# Patient Record
Sex: MALE | Race: WHITE | Employment: OTHER | URBAN - NONMETROPOLITAN AREA
[De-identification: names, ages, dates, MRNs, and addresses within clinical notes are randomized per-mention and may not be internally consistent; named-entity substitution may affect disease eponyms.]

---

## 2018-02-05 ENCOUNTER — HOSPITAL ENCOUNTER (EMERGENCY)
Age: 38
Discharge: ANOTHER HOSPITAL | End: 2018-02-05
Attending: EMERGENCY MEDICINE
Payer: MEDICARE

## 2018-02-05 ENCOUNTER — APPOINTMENT (OUTPATIENT)
Dept: CT IMAGING | Age: 38
End: 2018-02-05
Payer: MEDICARE

## 2018-02-05 ENCOUNTER — HOSPITAL ENCOUNTER (OUTPATIENT)
Age: 38
Setting detail: OBSERVATION
Discharge: STILL ADMITTED OR ADMINISTRATIVE | End: 2018-02-05
Attending: EMERGENCY MEDICINE | Admitting: PEDIATRICS
Payer: MEDICARE

## 2018-02-05 VITALS
TEMPERATURE: 98.7 F | SYSTOLIC BLOOD PRESSURE: 120 MMHG | RESPIRATION RATE: 13 BRPM | HEART RATE: 82 BPM | OXYGEN SATURATION: 95 % | DIASTOLIC BLOOD PRESSURE: 91 MMHG | BODY MASS INDEX: 24.77 KG/M2 | WEIGHT: 193 LBS | HEIGHT: 74 IN

## 2018-02-05 VITALS
RESPIRATION RATE: 18 BRPM | TEMPERATURE: 97.8 F | OXYGEN SATURATION: 97 % | BODY MASS INDEX: 24.77 KG/M2 | WEIGHT: 193 LBS | HEART RATE: 73 BPM | SYSTOLIC BLOOD PRESSURE: 139 MMHG | HEIGHT: 74 IN | DIASTOLIC BLOOD PRESSURE: 84 MMHG

## 2018-02-05 DIAGNOSIS — M79.602 LEFT ARM PAIN: Primary | ICD-10-CM

## 2018-02-05 DIAGNOSIS — R07.9 CHEST PAIN, UNSPECIFIED TYPE: Primary | ICD-10-CM

## 2018-02-05 DIAGNOSIS — R77.8 ELEVATED TROPONIN: ICD-10-CM

## 2018-02-05 PROBLEM — I25.10 CAD IN NATIVE ARTERY: Status: ACTIVE | Noted: 2018-02-05

## 2018-02-05 PROBLEM — I25.9 CHEST PAIN DUE TO MYOCARDIAL ISCHEMIA: Status: ACTIVE | Noted: 2018-02-05

## 2018-02-05 LAB
ABSOLUTE EOS #: 0.07 K/UL (ref 0–0.44)
ABSOLUTE EOS #: 0.1 K/UL (ref 0–0.4)
ABSOLUTE IMMATURE GRANULOCYTE: <0.03 K/UL (ref 0–0.3)
ABSOLUTE IMMATURE GRANULOCYTE: ABNORMAL K/UL (ref 0–0.3)
ABSOLUTE LYMPH #: 1.1 K/UL (ref 1–4.8)
ABSOLUTE LYMPH #: 2.12 K/UL (ref 1.1–3.7)
ABSOLUTE MONO #: 0.4 K/UL (ref 0–1)
ABSOLUTE MONO #: 0.68 K/UL (ref 0.1–1.2)
ACTIVATED CLOTTING TIME: 184 SEC (ref 79–149)
ACTIVATED CLOTTING TIME: 184 SEC (ref 79–149)
ACTIVATED CLOTTING TIME: 221 SEC (ref 79–149)
ALLEN TEST: ABNORMAL
ALLEN TEST: ABNORMAL
ALLEN TEST: NORMAL
ANION GAP SERPL CALCULATED.3IONS-SCNC: 11 MMOL/L (ref 9–17)
ANION GAP SERPL CALCULATED.3IONS-SCNC: 12 MMOL/L (ref 9–17)
BASOPHILS # BLD: 1 % (ref 0–2)
BASOPHILS # BLD: 1 % (ref 0–2)
BASOPHILS ABSOLUTE: 0 K/UL (ref 0–0.2)
BASOPHILS ABSOLUTE: 0.05 K/UL (ref 0–0.2)
BNP INTERPRETATION: NORMAL
BUN BLDV-MCNC: 10 MG/DL (ref 6–20)
BUN BLDV-MCNC: 16 MG/DL (ref 6–20)
BUN/CREAT BLD: 21 (ref 9–20)
BUN/CREAT BLD: ABNORMAL (ref 9–20)
CALCIUM SERPL-MCNC: 9 MG/DL (ref 8.6–10.4)
CALCIUM SERPL-MCNC: 9.5 MG/DL (ref 8.6–10.4)
CHLORIDE BLD-SCNC: 103 MMOL/L (ref 98–107)
CHLORIDE BLD-SCNC: 104 MMOL/L (ref 98–107)
CO2: 22 MMOL/L (ref 20–31)
CO2: 26 MMOL/L (ref 20–31)
CREAT SERPL-MCNC: 0.63 MG/DL (ref 0.7–1.2)
CREAT SERPL-MCNC: 0.78 MG/DL (ref 0.7–1.2)
DIFFERENTIAL TYPE: ABNORMAL
DIFFERENTIAL TYPE: NORMAL
EKG ATRIAL RATE: 68 BPM
EKG ATRIAL RATE: 82 BPM
EKG P AXIS: 27 DEGREES
EKG P AXIS: 31 DEGREES
EKG P-R INTERVAL: 212 MS
EKG P-R INTERVAL: 222 MS
EKG Q-T INTERVAL: 386 MS
EKG Q-T INTERVAL: 402 MS
EKG QRS DURATION: 106 MS
EKG QRS DURATION: 96 MS
EKG QTC CALCULATION (BAZETT): 427 MS
EKG QTC CALCULATION (BAZETT): 450 MS
EKG R AXIS: -34 DEGREES
EKG R AXIS: -38 DEGREES
EKG T AXIS: 20 DEGREES
EKG T AXIS: 22 DEGREES
EKG VENTRICULAR RATE: 68 BPM
EKG VENTRICULAR RATE: 82 BPM
EOSINOPHILS RELATIVE PERCENT: 1 % (ref 0–8)
EOSINOPHILS RELATIVE PERCENT: 1 % (ref 1–4)
FIO2: ABNORMAL
FIO2: ABNORMAL
FIO2: NORMAL
GFR AFRICAN AMERICAN: >60 ML/MIN
GFR AFRICAN AMERICAN: >60 ML/MIN
GFR NON-AFRICAN AMERICAN: >60 ML/MIN
GFR NON-AFRICAN AMERICAN: >60 ML/MIN
GFR SERPL CREATININE-BSD FRML MDRD: ABNORMAL ML/MIN/{1.73_M2}
GLUCOSE BLD-MCNC: 102 MG/DL (ref 70–99)
GLUCOSE BLD-MCNC: 105 MG/DL (ref 70–99)
HCT VFR BLD CALC: 46.6 % (ref 40.7–50.3)
HCT VFR BLD CALC: 47.1 % (ref 41–53)
HEMOGLOBIN: 15.6 G/DL (ref 13–17)
HEMOGLOBIN: 15.8 G/DL (ref 13.5–17)
IMMATURE GRANULOCYTES: 0 %
IMMATURE GRANULOCYTES: ABNORMAL %
INR BLD: 2.4 (ref 0.9–1.2)
LACTIC ACID, WHOLE BLOOD: 1.5 MMOL/L (ref 0.7–2.1)
LACTIC ACID: NORMAL MMOL/L
LYMPHOCYTES # BLD: 17 % (ref 24–44)
LYMPHOCYTES # BLD: 34 % (ref 24–43)
MCH RBC QN AUTO: 29.1 PG (ref 26–34)
MCH RBC QN AUTO: 29.4 PG (ref 25.2–33.5)
MCHC RBC AUTO-ENTMCNC: 33.4 G/DL (ref 31–37)
MCHC RBC AUTO-ENTMCNC: 33.5 G/DL (ref 28.4–34.8)
MCV RBC AUTO: 86.9 FL (ref 80–100)
MCV RBC AUTO: 87.8 FL (ref 82.6–102.9)
MODE: ABNORMAL
MODE: ABNORMAL
MODE: NORMAL
MONOCYTES # BLD: 11 % (ref 3–12)
MONOCYTES # BLD: 7 % (ref 0–12)
NEGATIVE BASE EXCESS, ART: 2 (ref 0–2)
NEGATIVE BASE EXCESS, ART: 5 (ref 0–2)
NEGATIVE BASE EXCESS, ART: NORMAL (ref 0–2)
NRBC AUTOMATED: 0 PER 100 WBC
NRBC AUTOMATED: ABNORMAL PER 100 WBC
O2 DEVICE/FLOW/%: ABNORMAL
O2 DEVICE/FLOW/%: ABNORMAL
O2 DEVICE/FLOW/%: NORMAL
PATIENT TEMP: ABNORMAL
PATIENT TEMP: ABNORMAL
PATIENT TEMP: NORMAL
PDW BLD-RTO: 13.7 % (ref 11.8–14.4)
PDW BLD-RTO: 14.1 % (ref 12.1–15.2)
PLATELET # BLD: 199 K/UL (ref 138–453)
PLATELET # BLD: 211 K/UL (ref 140–450)
PLATELET ESTIMATE: ABNORMAL
PLATELET ESTIMATE: NORMAL
PMV BLD AUTO: 11.7 FL (ref 8.1–13.5)
PMV BLD AUTO: 9.8 FL (ref 6–12)
POC HCO3: 19.3 MMOL/L (ref 21–28)
POC HCO3: 23.4 MMOL/L (ref 21–28)
POC HCO3: 24.7 MMOL/L (ref 21–28)
POC O2 SATURATION: 94 % (ref 94–98)
POC O2 SATURATION: 95 % (ref 94–98)
POC O2 SATURATION: 97 % (ref 94–98)
POC PCO2 TEMP: ABNORMAL MM HG
POC PCO2 TEMP: ABNORMAL MM HG
POC PCO2 TEMP: NORMAL MM HG
POC PCO2: 33.3 MM HG (ref 35–48)
POC PCO2: 38.7 MM HG (ref 35–48)
POC PCO2: 40.7 MM HG (ref 35–48)
POC PH TEMP: ABNORMAL
POC PH TEMP: ABNORMAL
POC PH TEMP: NORMAL
POC PH: 7.37 (ref 7.35–7.45)
POC PH: 7.37 (ref 7.35–7.45)
POC PH: 7.41 (ref 7.35–7.45)
POC PO2 TEMP: ABNORMAL MM HG
POC PO2 TEMP: ABNORMAL MM HG
POC PO2 TEMP: NORMAL MM HG
POC PO2: 71.7 MM HG (ref 83–108)
POC PO2: 79 MM HG (ref 83–108)
POC PO2: 88 MM HG (ref 83–108)
POC TROPONIN I: 44.2 NG/ML (ref 0–0.1)
POC TROPONIN INTERP: ABNORMAL
POSITIVE BASE EXCESS, ART: 0 (ref 0–3)
POSITIVE BASE EXCESS, ART: ABNORMAL (ref 0–3)
POSITIVE BASE EXCESS, ART: ABNORMAL (ref 0–3)
POTASSIUM SERPL-SCNC: 4 MMOL/L (ref 3.7–5.3)
POTASSIUM SERPL-SCNC: 4.7 MMOL/L (ref 3.7–5.3)
PRO-BNP: 136 PG/ML
PROTHROMBIN TIME: 26.8 SEC (ref 9.7–12.2)
RBC # BLD: 5.31 M/UL (ref 4.21–5.77)
RBC # BLD: 5.42 M/UL (ref 4.5–5.9)
RBC # BLD: ABNORMAL 10*6/UL
RBC # BLD: NORMAL 10*6/UL
SAMPLE SITE: ABNORMAL
SAMPLE SITE: ABNORMAL
SAMPLE SITE: NORMAL
SEG NEUTROPHILS: 53 % (ref 36–65)
SEG NEUTROPHILS: 74 % (ref 36–66)
SEGMENTED NEUTROPHILS ABSOLUTE COUNT: 3.24 K/UL (ref 1.5–8.1)
SEGMENTED NEUTROPHILS ABSOLUTE COUNT: 5 K/UL (ref 1.8–7.7)
SODIUM BLD-SCNC: 137 MMOL/L (ref 135–144)
SODIUM BLD-SCNC: 141 MMOL/L (ref 135–144)
TCO2 (CALC), ART: 20 MMOL/L (ref 22–29)
TCO2 (CALC), ART: 25 MMOL/L (ref 22–29)
TCO2 (CALC), ART: 26 MMOL/L (ref 22–29)
TROPONIN INTERP: ABNORMAL
TROPONIN T: 0.07 NG/ML
TROPONIN T: 3.21 NG/ML
TROPONIN T: 3.6 NG/ML
WBC # BLD: 6.2 K/UL (ref 3.5–11.3)
WBC # BLD: 6.7 K/UL (ref 3.5–11)
WBC # BLD: ABNORMAL 10*3/UL
WBC # BLD: NORMAL 10*3/UL

## 2018-02-05 PROCEDURE — 83605 ASSAY OF LACTIC ACID: CPT

## 2018-02-05 PROCEDURE — 6370000000 HC RX 637 (ALT 250 FOR IP): Performed by: EMERGENCY MEDICINE

## 2018-02-05 PROCEDURE — C1894 INTRO/SHEATH, NON-LASER: HCPCS

## 2018-02-05 PROCEDURE — C1769 GUIDE WIRE: HCPCS

## 2018-02-05 PROCEDURE — 85347 COAGULATION TIME ACTIVATED: CPT

## 2018-02-05 PROCEDURE — 86900 BLOOD TYPING SEROLOGIC ABO: CPT

## 2018-02-05 PROCEDURE — 71275 CT ANGIOGRAPHY CHEST: CPT

## 2018-02-05 PROCEDURE — 86901 BLOOD TYPING SEROLOGIC RH(D): CPT

## 2018-02-05 PROCEDURE — 80048 BASIC METABOLIC PNL TOTAL CA: CPT

## 2018-02-05 PROCEDURE — 99285 EMERGENCY DEPT VISIT HI MDM: CPT

## 2018-02-05 PROCEDURE — 93320 DOPPLER ECHO COMPLETE: CPT

## 2018-02-05 PROCEDURE — 6360000004 HC RX CONTRAST MEDICATION: Performed by: EMERGENCY MEDICINE

## 2018-02-05 PROCEDURE — 2500000003 HC RX 250 WO HCPCS: Performed by: EMERGENCY MEDICINE

## 2018-02-05 PROCEDURE — 93567 NJX CAR CTH SPRVLV AORTGRPHY: CPT

## 2018-02-05 PROCEDURE — 6360000002 HC RX W HCPCS

## 2018-02-05 PROCEDURE — 6360000002 HC RX W HCPCS: Performed by: EMERGENCY MEDICINE

## 2018-02-05 PROCEDURE — 84484 ASSAY OF TROPONIN QUANT: CPT

## 2018-02-05 PROCEDURE — 99284 EMERGENCY DEPT VISIT MOD MDM: CPT

## 2018-02-05 PROCEDURE — 93005 ELECTROCARDIOGRAM TRACING: CPT

## 2018-02-05 PROCEDURE — 83880 ASSAY OF NATRIURETIC PEPTIDE: CPT

## 2018-02-05 PROCEDURE — 82803 BLOOD GASES ANY COMBINATION: CPT

## 2018-02-05 PROCEDURE — 75625 CONTRAST EXAM ABDOMINL AORTA: CPT

## 2018-02-05 PROCEDURE — 36415 COLL VENOUS BLD VENIPUNCTURE: CPT

## 2018-02-05 PROCEDURE — 96374 THER/PROPH/DIAG INJ IV PUSH: CPT

## 2018-02-05 PROCEDURE — 93304 ECHO TRANSTHORACIC: CPT

## 2018-02-05 PROCEDURE — 93456 R HRT CORONARY ARTERY ANGIO: CPT

## 2018-02-05 PROCEDURE — 85025 COMPLETE CBC W/AUTO DIFF WBC: CPT

## 2018-02-05 PROCEDURE — 86920 COMPATIBILITY TEST SPIN: CPT

## 2018-02-05 PROCEDURE — 96376 TX/PRO/DX INJ SAME DRUG ADON: CPT

## 2018-02-05 PROCEDURE — 86850 RBC ANTIBODY SCREEN: CPT

## 2018-02-05 PROCEDURE — 85610 PROTHROMBIN TIME: CPT

## 2018-02-05 PROCEDURE — 93325 DOPPLER ECHO COLOR FLOW MAPG: CPT

## 2018-02-05 PROCEDURE — C1725 CATH, TRANSLUMIN NON-LASER: HCPCS

## 2018-02-05 RX ORDER — SODIUM CHLORIDE 0.9 % (FLUSH) 0.9 %
3 SYRINGE (ML) INJECTION PRN
Status: CANCELLED | OUTPATIENT
Start: 2018-02-05

## 2018-02-05 RX ORDER — MORPHINE SULFATE 4 MG/ML
4 INJECTION, SOLUTION INTRAMUSCULAR; INTRAVENOUS ONCE
Status: COMPLETED | OUTPATIENT
Start: 2018-02-05 | End: 2018-02-05

## 2018-02-05 RX ORDER — WARFARIN SODIUM 5 MG/1
5 TABLET ORAL
Status: CANCELLED | OUTPATIENT
Start: 2018-02-05 | End: 2018-02-06

## 2018-02-05 RX ORDER — ENALAPRIL MALEATE 5 MG/1
1 TABLET ORAL DAILY
Status: CANCELLED | OUTPATIENT
Start: 2018-02-05

## 2018-02-05 RX ORDER — MORPHINE SULFATE 4 MG/ML
4 INJECTION, SOLUTION INTRAMUSCULAR; INTRAVENOUS ONCE
Status: DISCONTINUED | OUTPATIENT
Start: 2018-02-05 | End: 2018-02-05 | Stop reason: SDUPTHER

## 2018-02-05 RX ORDER — ASPIRIN 81 MG/1
324 TABLET, CHEWABLE ORAL ONCE
Status: COMPLETED | OUTPATIENT
Start: 2018-02-05 | End: 2018-02-05

## 2018-02-05 RX ORDER — MORPHINE SULFATE 4 MG/ML
4 INJECTION, SOLUTION INTRAMUSCULAR; INTRAVENOUS ONCE
Status: DISCONTINUED | OUTPATIENT
Start: 2018-02-05 | End: 2018-02-05 | Stop reason: RX

## 2018-02-05 RX ORDER — NITROGLYCERIN 20 MG/100ML
5 INJECTION INTRAVENOUS CONTINUOUS
Status: DISCONTINUED | OUTPATIENT
Start: 2018-02-05 | End: 2018-02-05 | Stop reason: HOSPADM

## 2018-02-05 RX ORDER — METOPROLOL TARTRATE 50 MG/1
50 TABLET, FILM COATED ORAL 2 TIMES DAILY
Status: CANCELLED | OUTPATIENT
Start: 2018-02-05

## 2018-02-05 RX ORDER — MORPHINE SULFATE 2 MG/ML
4 INJECTION, SOLUTION INTRAMUSCULAR; INTRAVENOUS ONCE
Status: COMPLETED | OUTPATIENT
Start: 2018-02-05 | End: 2018-02-05

## 2018-02-05 RX ORDER — MORPHINE SULFATE 4 MG/ML
4 INJECTION, SOLUTION INTRAMUSCULAR; INTRAVENOUS ONCE
Status: DISCONTINUED | OUTPATIENT
Start: 2018-02-05 | End: 2018-02-05

## 2018-02-05 RX ORDER — WARFARIN SODIUM 10 MG/1
10 TABLET ORAL DAILY
Status: CANCELLED | OUTPATIENT
Start: 2018-02-05

## 2018-02-05 RX ORDER — ACETAMINOPHEN 325 MG/1
650 TABLET ORAL EVERY 8 HOURS PRN
Status: CANCELLED | OUTPATIENT
Start: 2018-02-05

## 2018-02-05 RX ORDER — ENALAPRIL MALEATE 10 MG/1
1 TABLET ORAL DAILY
Status: CANCELLED | OUTPATIENT
Start: 2018-02-05

## 2018-02-05 RX ORDER — ACETAMINOPHEN 160 MG/5ML
650 SOLUTION ORAL EVERY 8 HOURS PRN
Status: CANCELLED | OUTPATIENT
Start: 2018-02-05

## 2018-02-05 RX ADMIN — MORPHINE SULFATE 4 MG: 2 INJECTION, SOLUTION INTRAMUSCULAR; INTRAVENOUS at 09:42

## 2018-02-05 RX ADMIN — IOPAMIDOL 100 ML: 612 INJECTION, SOLUTION INTRAVENOUS at 04:07

## 2018-02-05 RX ADMIN — ASPIRIN 81 MG 324 MG: 81 TABLET ORAL at 03:42

## 2018-02-05 RX ADMIN — MORPHINE SULFATE 4 MG: 2 INJECTION, SOLUTION INTRAMUSCULAR; INTRAVENOUS at 07:51

## 2018-02-05 RX ADMIN — MORPHINE SULFATE 4 MG: 4 INJECTION, SOLUTION INTRAMUSCULAR; INTRAVENOUS at 04:08

## 2018-02-05 RX ADMIN — NITROGLYCERIN 5 MCG/MIN: 20 INJECTION INTRAVENOUS at 09:39

## 2018-02-05 RX ADMIN — MORPHINE SULFATE 4 MG: 4 INJECTION, SOLUTION INTRAMUSCULAR; INTRAVENOUS at 05:33

## 2018-02-05 ASSESSMENT — PAIN SCALES - GENERAL
PAINLEVEL_OUTOF10: 6
PAINLEVEL_OUTOF10: 5
PAINLEVEL_OUTOF10: 7
PAINLEVEL_OUTOF10: 7
PAINLEVEL_OUTOF10: 4
PAINLEVEL_OUTOF10: 6
PAINLEVEL_OUTOF10: 2

## 2018-02-05 ASSESSMENT — PAIN DESCRIPTION - DESCRIPTORS
DESCRIPTORS: ACHING

## 2018-02-05 ASSESSMENT — ENCOUNTER SYMPTOMS
SHORTNESS OF BREATH: 0
BLOOD IN STOOL: 0
NAUSEA: 0
EYE REDNESS: 0
DIARRHEA: 0
COUGH: 0
RHINORRHEA: 0
EYE DISCHARGE: 0
SORE THROAT: 0
VOMITING: 0
CHEST TIGHTNESS: 0
ABDOMINAL PAIN: 0

## 2018-02-05 ASSESSMENT — PAIN DESCRIPTION - PAIN TYPE
TYPE: ACUTE PAIN

## 2018-02-05 ASSESSMENT — HEART SCORE: ECG: 0

## 2018-02-05 ASSESSMENT — PAIN DESCRIPTION - ORIENTATION
ORIENTATION: LEFT

## 2018-02-05 ASSESSMENT — PAIN DESCRIPTION - FREQUENCY
FREQUENCY: CONTINUOUS

## 2018-02-05 ASSESSMENT — PAIN DESCRIPTION - LOCATION
LOCATION: ARM
LOCATION: ARM;JAW
LOCATION: ARM;JAW
LOCATION: JAW
LOCATION: ARM;JAW

## 2018-02-05 NOTE — ED PROVIDER NOTES
Spouse name: N/A    Number of children: N/A    Years of education: N/A     Occupational History    Not on file. Social History Main Topics    Smoking status: Never Smoker    Smokeless tobacco: Never Used    Alcohol use Yes      Comment: socially    Drug use: No    Sexual activity: Not on file     Other Topics Concern    Not on file     Social History Narrative    No narrative on file       Family History   Problem Relation Age of Onset   24 [facility] Father      colon       Allergies:  No known allergies    Home Medications:  Prior to Admission medications    Medication Sig Start Date End Date Taking? Authorizing Provider   enalapril (VASOTEC) 10 MG tablet TAKE ONE TABLET BY MOUTH EVERY DAY 3/10/16   Irma Pablo MD   enalapril (VASOTEC) 5 MG tablet TAKE ONE TABLET BY MOUTH DAILY 3/10/16   Irma Pablo MD   metoprolol (LOPRESSOR) 50 MG tablet Take 1 tablet by mouth 2 times daily 3/10/16   Irma Pablo MD   warfarin (COUMADIN) 5 MG tablet TAKE TWO TABLETS BY MOUTH DAILY 12/23/15   Irma Pablo MD   warfarin (COUMADIN) 5 MG tablet Take 2 tablets by mouth daily. Patient taking differently: Take 10 mg by mouth daily 7.5mg alternating with 10mg every other day 10/16/14   Poli Diaz MD   aspirin 81 MG tablet Take 81 mg by mouth daily. Historical Provider, MD       REVIEW OF SYSTEMS    (2-9 systems for level 4, 10 or more for level 5)      Review of Systems   Constitutional: Negative for chills and fever. HENT: Negative for congestion, rhinorrhea and sore throat. Left-sided jaw pain   Eyes: Negative for discharge and redness. Respiratory: Negative for cough, chest tightness and shortness of breath. Cardiovascular: Positive for chest pain. Gastrointestinal: Negative for abdominal pain, blood in stool, diarrhea, nausea and vomiting. Endocrine: Negative for polydipsia and polyuria. Genitourinary: Negative for dysuria and hematuria.    Musculoskeletal: Negative for

## 2018-02-05 NOTE — H&P
Adult congenital cardiology cardiac catheterization  History and physical examination     Patient: Verna Christensen  MR Number: [id number]  YOB: 1980  Date of  Procedure:  February 5, 2018  PCP: Radha Jimenez MD        Reason for cardiac catheterization:  Acute rise in serum troponin with possible ischemic symptoms but normal EKG     History of present illness:  Forest Wilde as you know is a 40year-old with Marfan syndrome who has a prosthetic aortic valve after having a Bentall procedure for ascending aortic dissection In 1991. He subsequently develops symptoms of acute dissection and in 2010 underwent aortic fenestration and stenting of the lead point of a area of dissection in his arch. In 2011 January he underwent redo mitral valve repair because of severe mitral insufficiency from what appeared to be a partial flail mitral chord. In March of 2011 he had further replacement of his distal aortic arch as well as the proximal 3rd of his thoracic descending aorta which at that time measured 52 mm because of vasculopathy.     In June of 2011 he was noted to have a nonischemic left ventricular cardiomyopathy with an ejection fraction that was significantly diminished and associated ventricular tachycardia and thus had an AICD placed at the [facility]. He has since showed improvement in ventricular systolic function and continues on systemic anticoagulation with intermittent episodes of poor compliance, but no complications regarding his prosthetic aortic valve but has not recently had his PT/INR performed.       Early this am he developed 7 of 10 chest discomfort with radiation to the jaw and left shoulder pain. He decided to go to the ED at [facility] where he was mildly hypertensive, with normal oxygen saturations and no dysrhythmia. His Blood work was remarkable for an elevated troponin I and I advised transfer to [facility] .   His CT was essentially unchanged with Yanni measured at the diaphram (47 x 52 mm) and at the mid-thoracic area (52 x 54 mm) with chronic dissection and no contrast extravasation. Since our last evaluation in 2016, he has not shown up for follow-up. He was seen by Dr. Luis Tamez over almost 2 years ago with no records AICD device discharged and no subclinical dysrhythmia.         He lives at home with his girlfriend and 3and a half-year-old daughter. He denies smoking, but does occasionally consume alcohol. Current Coumadin dose is 7.5 mg, 3 times a week, and 10 mg on all other days.  Enalapril 10 mg in the morning and 5 mg in the evening.      Respiratory ROS: unremarkable  Endocrine ROS: unremarkable  Gastrointestinal ROS: unremarkable  Genito-Urinary ROS: unremarkable  Dermatological ROS: unremarkable  Lymphatic ROS:unremarkable  Musculoskeletal ROS: unremarkable  Allergy and Immunology ROS: unremarkable                 Current Outpatient Prescriptions   Medication Sig Dispense Refill    enalapril (VASOTEC) 10 MG tablet Take 1 tablet daily 30 tablet 11    enalapril (VASOTEC) 5 MG tablet Take 1 tablet daily 30 tablet 11    metoprolol (LOPRESSOR) 50 MG tablet Take 1 tablet 2 times daily 60 tablet 11    warfarin (COUMADIN) 5 MG tablet Take 2 tablets daily 60 tablet 11    aspirin 81 MG tablet Take 81 mg daily.                  Allergies   Allergen Reactions    No Known Allergies        On physical examination, Marixa Villalba was alert, cooperative, acyanotic and in no apparent distress. Vitals   There were no vitals filed for this visit.        The HEENT examination is unremarkable.  Lungs were clear bilaterally.  Chest examination demonstrated a right and thoracotomy scar as well as a sternotomy incision and he was quite thin.  There was no palpable thrill and he had a normal  percordial impulse with a shift in the apical impulse laterally and inferiorly.  There was a regular rate and rhythm with a normal first heart sound and mechanical and palpable second heart sound.  A

## 2018-02-05 NOTE — OP NOTE
mid-thoracic area (52 x 54 mm) with chronic dissection and no contrast extravasation. Since our last evaluation in 2016, he has not shown up for follow-up. He was seen by Dr. Celso Alegria over almost 2 years ago with no records AICD device discharged and no subclinical dysrhythmia.       Mihai Carlin tells me that he has been experiencing occasional palpitations, and one episode of tachycardia that lasted roughly 5 seconds, but was not associated with dizziness, or syncope. He states that he can experience one self-limited episode of fast heart rate roughly every month, but this is not prompted him to go to the emergency room. He still gets occasional headaches, but none are associated with any neurologic compromise. He denies any episodes of lethargy, or cyanosis. From a noncardiac perspective, he does get occasional low back pain, but does not complain of any sciatica.  He denies any recent episodes of vomiting, diarrhea, abdominal pain, or respiratory distress. His last INR was months ago. He tells me he has not experienced any significant bleeding and occasionally notices mild bruising but no significant hematomas. He is currently not working.      He lives at home with his girlfriend and 3and a half-year-old daughter. He denies smoking, but does occasionally consume alcohol.  Current Coumadin dose is 7.5 mg, 3 times a week, and 10 mg on all other days.  Enalapril 10 mg in the morning and 5 mg in the evening.      Respiratory ROS: unremarkable  Endocrine ROS: unremarkable  Gastrointestinal ROS: unremarkable  Genito-Urinary ROS: unremarkable  Dermatological ROS: unremarkable  Lymphatic ROS:unremarkable  Musculoskeletal ROS: unremarkable  Allergy and Immunology ROS: unremarkable                 Current Outpatient Prescriptions   Medication Sig Dispense Refill    enalapril (VASOTEC) 10 MG tablet Take 1 tablet daily 30 tablet 11    enalapril (VASOTEC) 5 MG tablet Take 1 tablet daily 30 tablet 11    metoprolol (LOPRESSOR) available to compare.        Prominent aneurysmal dilatation of the mid distal thoracic aorta measuring 5.1 cm in diameter.        Aneurysm dilatation of the ascending aorta measuring 3.6 cm in diameter        No evidence of pulmonary embolus or infiltrate.      Last AICD check 2016 (Dr. Corona Ridley) : Good battery life. No device discharges.      Last recorded echocardiogram: 9-: S/p aortic dissection (type B) graft replacement of aortic root and proximal descending aorta.  S/p Bentall repair (aortic valve replacement and aortic arch repair). S/p mitral valve repair with Reji ring with mild residual prolapse and regurgitation. Suboptimal imaging due to poor acoustic windows. Mild left ventricular dilatation with mild LVH and normal systolic function. Minimal increased velocity across the prosthetic aortic valve with trivial central regurgitation.  Mild dilatation seen in the aortic root. Normal flow velocity across the descending aorta. Mild mitral valve regurgitation with trivial prolapse and associated left   atrial enlargement. Mitral valve E/A wave reversal is seen consistent with impaired compliance.     Today's echocardiogram is similar but limited by a poor transthoracic window. Heart function is normal. Redundant mitral valve with trivial regurgitation. Prior concerns, and follow-up:   1. Mild left ventricular dilatation with normal systolic function. 2. Mild increased velocity across the prosthetic aortic valve with trivial regurgitation. 3. Mild aneurysm dilatation seen in the aortic root (41 mm -possibly underestimated)  4. Mild mitral valve regurgitation with trivial prolapse. Mitral valve E/A wave reversal is seen consistent with impaired compliance. 5. The proximal transverse arch was not well seen. 6. Descending aortic aneurysm, not seen by echocardiography.  The previously visualized on CT scan.       Today's laboratory studies demonstrated a normal set of chemistries and with LA mean 10 mmHg). Narciso: 100/58 (76) mmHg; AAo: 100/63 (80) mmHg. Assumed MVO2: 175 ml/min/m2      Angiography:  1. Left ventricular injection was performed in retrograde fashion using a pigtail catheter positioned in the left ventricular apex. Initial opaciflcation demonstrates a normal sized smooth-walled left ventricle with normal systolic function. There is no atrioventricular valve regurgitation and no ectopy was seen. The outflow tract is unobstructed and the aortic arch is left-sided with normal branching.     2. Aortic root injection: No aortic regurgitation seen. The right coronary artery is profiled well and is without abnormality. The left main coronary artery demonstrates mild ectasia but both the left circumflex and LAD appear normal without distal stenosis or aneurysm.     3. The thoracic aortagram showed no area of dilation in the proximal descending aorta. The left subclavian artery did show mid-vessel stenosis with otherwise normal head and neck vessel branching. A small ductal diverticulum is seen on lateral imaging without PDA.     4.    A selective left subclavian arterial injection was performed after manipulation of the left arm and no stenosis was seen. The previously seen narrowing was thus felt to be positional in nature.      5.   (Selective left coronary arterial injection) demonstrates mild ectasia of the left main coronary artery measuring 5-5.5 mm. There is no thrombus or stenosis seen. The left circumflex and LAD are normal without distal aneurysm or stenosis. There is no orifice stenosis, vasospasm, dissection or aneurysms. There is no obvious filling defect or evidence of myocardial bridging, The coronary sinus filling phase was normal.     Complications: None                        Fluoroscopy Time: 7.8 min              Contrast: 125 c.c.s     Post-Cath. Diagnosis:     1. S/P Kawasaki's syndrome     2.   Mild left main coronary arterial ectasia (5-5.5 mm by

## 2018-02-05 NOTE — H&P
Adult congenital cardiology cardiac catheterization  History and physical examination     Patient: Larissa Wen  MR Number: [id number]  YOB: 1980  Date of  Procedure:  February 5, 2018  PCP: Cassie Diehl MD        Reason for cardiac catheterization:  Acute rise in serum troponin with possible ischemic symptoms but normal EKG     History of present illness:  Ramesh as you know is a 40year-old with Marfan syndrome who has a prosthetic aortic valve after having a Bentall procedure for ascending aortic dissection In 1991. He subsequently develops symptoms of acute dissection and in 2010 underwent aortic fenestration and stenting of the lead point of a area of dissection in his arch.  In 2011 January he underwent redo mitral valve repair because of severe mitral insufficiency from what appeared to be a partial flail mitral chord. In March of 2011 he had further replacement of his distal aortic arch as well as the proximal 3rd of his thoracic descending aorta which at that time measured 52 mm because of vasculopathy.     In June of 2011 he was noted to have a nonischemic left ventricular cardiomyopathy with an ejection fraction that was significantly diminished and associated ventricular tachycardia and thus had an AICD placed at the [facility].  He has since showed improvement in ventricular systolic function and continues on systemic anticoagulation with intermittent episodes of poor compliance, but no complications regarding his prosthetic aortic valve but has not recently had his PT/INR performed.       Early this am he developed 7 of 10 chest discomfort with radiation to the jaw and left shoulder pain.  He decided to go to the ED at [place] where he was mildly hypertensive, with normal oxygen saturations and no dysrhythmia.  His Blood work was remarkable for an elevated troponin I and I advised transfer to [facility] . [facility] CT was essentially unchanged with Yanni measured at the thoracic aorta measuring 5.1 cm in diameter.        Aneurysm dilatation of the ascending aorta measuring 3.6 cm in diameter        No evidence of pulmonary embolus or infiltrate.      Last AICD check 2016 (Dr. Miladys Stanley) : Good battery life.  No device discharges.      Last recorded echocardiogram: 9-: S/p aortic dissection (type B) graft replacement of aortic root and proximal descending aorta.   S/p Bentall repair (aortic valve replacement and aortic arch repair).  S/p mitral valve repair with Reji ring with mild residual prolapse and regurgitation.  Suboptimal imaging due to poor acoustic windows.  Mild left ventricular dilatation with mild LVH and normal systolic function.  Minimal increased velocity across the prosthetic aortic valve with trivial central regurgitation.  Mild dilatation seen in the aortic root.  Normal flow velocity across the descending aorta.  Mild mitral valve regurgitation with trivial prolapse and associated left   atrial enlargement.  Mitral valve E/A wave reversal is seen consistent with impaired compliance.     Prior concerns, and follow-up:   1. Mild left ventricular dilatation with normal systolic function. 2. Mild increased velocity across the prosthetic aortic valve with trivial regurgitation. 3. Mild aneurysm dilatation seen in the aortic root (41 mm -possibly underestimated)  4. Mild mitral valve regurgitation with trivial prolapse. Mitral valve E/A wave reversal is seen consistent with impaired compliance. 5. The proximal transverse arch was not well seen. 6. Descending aortic aneurysm, not seen by echocardiography. The previously visualized on CT scan.       Today's laboratory studies demonstrated a normal set of chemistries and normal creatinine.  His BUN was mildly elevated and calcium was normal.  BNP was 136 and initial troponin was 0.07 with a subsequent repeat after number of hours to  3. 21.     CBC was normal with a hemoglobin of 15.8 grams/dl and platelet

## 2018-02-06 LAB
ABO/RH: NORMAL
ANTIBODY SCREEN: NEGATIVE
ARM BAND NUMBER: NORMAL
BLD PROD TYP BPU: NORMAL
BLD PROD TYP BPU: NORMAL
CROSSMATCH RESULT: NORMAL
CROSSMATCH RESULT: NORMAL
DISPENSE STATUS BLOOD BANK: NORMAL
DISPENSE STATUS BLOOD BANK: NORMAL
EXPIRATION DATE: NORMAL
TRANSFUSION STATUS: NORMAL
TRANSFUSION STATUS: NORMAL
UNIT DIVISION: 0
UNIT DIVISION: 0
UNIT NUMBER: NORMAL
UNIT NUMBER: NORMAL

## 2018-02-08 LAB
EKG ATRIAL RATE: 72 BPM
EKG P AXIS: 46 DEGREES
EKG P-R INTERVAL: 204 MS
EKG Q-T INTERVAL: 386 MS
EKG QRS DURATION: 92 MS
EKG QTC CALCULATION (BAZETT): 422 MS
EKG R AXIS: -45 DEGREES
EKG T AXIS: 8 DEGREES
EKG VENTRICULAR RATE: 72 BPM

## 2018-08-08 ENCOUNTER — HOSPITAL ENCOUNTER (EMERGENCY)
Age: 38
Discharge: HOME | End: 2018-08-09
Attending: EMERGENCY MEDICINE
Payer: MEDICARE

## 2018-08-08 DIAGNOSIS — R07.9 CHEST PAIN, UNSPECIFIED TYPE: Primary | ICD-10-CM

## 2018-08-08 PROCEDURE — 93005 ELECTROCARDIOGRAM TRACING: CPT

## 2018-08-08 PROCEDURE — 99285 EMERGENCY DEPT VISIT HI MDM: CPT

## 2018-08-08 ASSESSMENT — PAIN DESCRIPTION - LOCATION: LOCATION: CHEST

## 2018-08-08 ASSESSMENT — PAIN DESCRIPTION - PAIN TYPE: TYPE: ACUTE PAIN

## 2018-08-08 ASSESSMENT — PAIN DESCRIPTION - ORIENTATION: ORIENTATION: LEFT

## 2018-08-08 ASSESSMENT — PAIN SCALES - GENERAL: PAINLEVEL_OUTOF10: 9

## 2018-08-09 ENCOUNTER — APPOINTMENT (OUTPATIENT)
Dept: CT IMAGING | Age: 38
End: 2018-08-09
Payer: MEDICARE

## 2018-08-09 VITALS
OXYGEN SATURATION: 94 % | RESPIRATION RATE: 12 BRPM | TEMPERATURE: 98.3 F | DIASTOLIC BLOOD PRESSURE: 62 MMHG | HEART RATE: 68 BPM | SYSTOLIC BLOOD PRESSURE: 128 MMHG

## 2018-08-09 LAB
ALBUMIN SERPL-MCNC: 4.6 G/DL (ref 3.5–5.2)
ALBUMIN/GLOBULIN RATIO: 1.4 (ref 1–2.5)
ALP BLD-CCNC: 92 U/L (ref 40–129)
ALT SERPL-CCNC: 18 U/L (ref 5–41)
ANION GAP SERPL CALCULATED.3IONS-SCNC: 13 MMOL/L (ref 9–17)
AST SERPL-CCNC: 26 U/L
BILIRUB SERPL-MCNC: 0.33 MG/DL (ref 0.3–1.2)
BUN BLDV-MCNC: 14 MG/DL (ref 6–20)
BUN/CREAT BLD: 18 (ref 9–20)
CALCIUM SERPL-MCNC: 9.4 MG/DL (ref 8.6–10.4)
CHLORIDE BLD-SCNC: 103 MMOL/L (ref 98–107)
CO2: 27 MMOL/L (ref 20–31)
CREAT SERPL-MCNC: 0.8 MG/DL (ref 0.7–1.2)
GFR AFRICAN AMERICAN: >60 ML/MIN
GFR NON-AFRICAN AMERICAN: >60 ML/MIN
GFR SERPL CREATININE-BSD FRML MDRD: ABNORMAL ML/MIN/{1.73_M2}
GFR SERPL CREATININE-BSD FRML MDRD: ABNORMAL ML/MIN/{1.73_M2}
GLUCOSE BLD-MCNC: 105 MG/DL (ref 70–99)
HCT VFR BLD CALC: 39.4 % (ref 40.7–50.3)
HEMOGLOBIN: 12 G/DL (ref 13–17)
MCH RBC QN AUTO: 27.1 PG (ref 25.2–33.5)
MCHC RBC AUTO-ENTMCNC: 30.5 G/DL (ref 28.4–34.8)
MCV RBC AUTO: 89.1 FL (ref 82.6–102.9)
NRBC AUTOMATED: 0 PER 100 WBC
PDW BLD-RTO: 14.7 % (ref 11.8–14.4)
PLATELET # BLD: 352 K/UL (ref 138–453)
PMV BLD AUTO: 10 FL (ref 8.1–13.5)
POTASSIUM SERPL-SCNC: 4 MMOL/L (ref 3.7–5.3)
RBC # BLD: 4.42 M/UL (ref 4.21–5.77)
SODIUM BLD-SCNC: 143 MMOL/L (ref 135–144)
TOTAL PROTEIN: 7.8 G/DL (ref 6.4–8.3)
TROPONIN INTERP: NORMAL
TROPONIN T: <0.03 NG/ML
WBC # BLD: 7.1 K/UL (ref 3.5–11.3)

## 2018-08-09 PROCEDURE — 85027 COMPLETE CBC AUTOMATED: CPT

## 2018-08-09 PROCEDURE — 6360000002 HC RX W HCPCS: Performed by: EMERGENCY MEDICINE

## 2018-08-09 PROCEDURE — 6360000004 HC RX CONTRAST MEDICATION: Performed by: EMERGENCY MEDICINE

## 2018-08-09 PROCEDURE — 71275 CT ANGIOGRAPHY CHEST: CPT

## 2018-08-09 PROCEDURE — 84484 ASSAY OF TROPONIN QUANT: CPT

## 2018-08-09 PROCEDURE — 80053 COMPREHEN METABOLIC PANEL: CPT

## 2018-08-09 PROCEDURE — 96375 TX/PRO/DX INJ NEW DRUG ADDON: CPT

## 2018-08-09 PROCEDURE — 96376 TX/PRO/DX INJ SAME DRUG ADON: CPT

## 2018-08-09 PROCEDURE — 93005 ELECTROCARDIOGRAM TRACING: CPT

## 2018-08-09 PROCEDURE — 36415 COLL VENOUS BLD VENIPUNCTURE: CPT

## 2018-08-09 PROCEDURE — 96374 THER/PROPH/DIAG INJ IV PUSH: CPT

## 2018-08-09 RX ORDER — ASCORBIC ACID 500 MG
500 TABLET ORAL 2 TIMES DAILY
COMMUNITY

## 2018-08-09 RX ORDER — MORPHINE SULFATE 2 MG/ML
2 INJECTION, SOLUTION INTRAMUSCULAR; INTRAVENOUS ONCE
Status: COMPLETED | OUTPATIENT
Start: 2018-08-09 | End: 2018-08-09

## 2018-08-09 RX ORDER — DOCUSATE SODIUM 100 MG/1
100 CAPSULE, LIQUID FILLED ORAL 2 TIMES DAILY
COMMUNITY

## 2018-08-09 RX ORDER — ONDANSETRON 2 MG/ML
4 INJECTION INTRAMUSCULAR; INTRAVENOUS ONCE
Status: COMPLETED | OUTPATIENT
Start: 2018-08-09 | End: 2018-08-09

## 2018-08-09 RX ORDER — FERROUS SULFATE 325(65) MG
325 TABLET ORAL
COMMUNITY

## 2018-08-09 RX ORDER — MORPHINE SULFATE 4 MG/ML
4 INJECTION, SOLUTION INTRAMUSCULAR; INTRAVENOUS ONCE
Status: COMPLETED | OUTPATIENT
Start: 2018-08-09 | End: 2018-08-09

## 2018-08-09 RX ADMIN — ONDANSETRON 4 MG: 2 INJECTION INTRAMUSCULAR; INTRAVENOUS at 01:22

## 2018-08-09 RX ADMIN — MORPHINE SULFATE 4 MG: 4 INJECTION INTRAVENOUS at 01:23

## 2018-08-09 RX ADMIN — IOPAMIDOL 100 ML: 612 INJECTION, SOLUTION INTRAVENOUS at 00:43

## 2018-08-09 RX ADMIN — MORPHINE SULFATE 2 MG: 2 INJECTION, SOLUTION INTRAMUSCULAR; INTRAVENOUS at 05:38

## 2018-08-09 ASSESSMENT — PAIN SCALES - GENERAL
PAINLEVEL_OUTOF10: 5
PAINLEVEL_OUTOF10: 6

## 2018-08-10 LAB
EKG ATRIAL RATE: 68 BPM
EKG ATRIAL RATE: 78 BPM
EKG P AXIS: 28 DEGREES
EKG P AXIS: 31 DEGREES
EKG P-R INTERVAL: 262 MS
EKG P-R INTERVAL: 268 MS
EKG Q-T INTERVAL: 412 MS
EKG Q-T INTERVAL: 422 MS
EKG QRS DURATION: 104 MS
EKG QRS DURATION: 108 MS
EKG QTC CALCULATION (BAZETT): 448 MS
EKG QTC CALCULATION (BAZETT): 469 MS
EKG R AXIS: -45 DEGREES
EKG R AXIS: -46 DEGREES
EKG T AXIS: 31 DEGREES
EKG T AXIS: 40 DEGREES
EKG VENTRICULAR RATE: 68 BPM
EKG VENTRICULAR RATE: 78 BPM

## 2018-08-13 LAB
EKG ATRIAL RATE: 81 BPM
EKG P AXIS: 33 DEGREES
EKG P-R INTERVAL: 238 MS
EKG Q-T INTERVAL: 402 MS
EKG QRS DURATION: 102 MS
EKG QTC CALCULATION (BAZETT): 466 MS
EKG R AXIS: -44 DEGREES
EKG T AXIS: 61 DEGREES
EKG VENTRICULAR RATE: 81 BPM

## 2018-08-14 ASSESSMENT — ENCOUNTER SYMPTOMS
GASTROINTESTINAL NEGATIVE: 1
COUGH: 0
CHOKING: 0
CHEST TIGHTNESS: 1
SHORTNESS OF BREATH: 0

## 2018-08-14 NOTE — ED PROVIDER NOTES
[facility] ED  eMERGENCY dEPARTMENT eNCOUnter      Pt Name: Sally Can  MRN: [id number]  Birthdate 1980  Date of evaluation: 8/8/2018  Provider: Milton Ye MD    CHIEF COMPLAINT       Chief Complaint   Patient presents with    Chest Pain     started 2200 today, recent heart surgury 7/11/18         HISTORY OF PRESENT ILLNESS   (Location/Symptom, Timing/Onset, Context/Setting, Quality, Duration, Modifying Factors, Severity)  Note limiting factors. Sally Can is a 40 y.o. male who presents to the emergency department      This is a 80-year-old male with a history of Marfan syndrome who just underwent cardiothoracic surgery for repair of dissection and aortic valve replacement. He been doing fine since surgery but tonight developed left-sided chest pain that was fairly severe. The pain had no radiation. It woke him from sleep. He has been up to ambulate and the name relating previously throughout the day with no shortness of breath or leg pain. He denies any nausea vomiting diarrhea or fever. Nursing Notes were reviewed. REVIEW OF SYSTEMS    (2-9 systems for level 4, 10 or more for level 5)     Review of Systems   Constitutional: Negative. Respiratory: Positive for chest tightness. Negative for cough, choking and shortness of breath. Cardiovascular: Positive for chest pain. Negative for palpitations and leg swelling. Gastrointestinal: Negative. Genitourinary: Negative. Musculoskeletal: Negative. Neurological: Negative. Except as noted above the remainder of the review of systems was reviewed and negative. PAST MEDICAL HISTORY     Past Medical History:   Diagnosis Date    AICD (automatic cardioverter/defibrillator) present     History of aortic dissection     Hypertension     Marfan's syndrome     DX.  AGE 16         SURGICAL HISTORY       Past Surgical History:   Procedure Laterality Date    ABDOMINAL AORTIC ANEURYSM REPAIR, ENDOVASCULAR  JAN. 2011    [facility]; ANNULOPLASTY BAND, GRAFT    AORTIC VALVE REPLACEMENT  11/13/2001    [facility];  mechanical valve [facility]    APPENDECTOMY  2012    CARDIAC CATHETERIZATION      multiple    CARDIAC DEFIBRILLATOR PLACEMENT  MARCH 2011    CARDIAC VALVE REPLACEMENT  2001    mechanical    CARDIAC VALVE REPLACEMENT  07/11/2018    INGUINAL HERNIA REPAIR Right 10/15/2014    WISDOM TOOTH EXTRACTION           CURRENT MEDICATIONS       Discharge Medication List as of 8/9/2018  8:15 AM      CONTINUE these medications which have NOT CHANGED    Details   vitamin C (ASCORBIC ACID) 500 MG tablet Take 500 mg by mouth 2 times dailyHistorical Med      docusate sodium (COLACE) 100 MG capsule Take 100 mg by mouth 2 times dailyHistorical Med      ferrous sulfate 325 (65 Fe) MG tablet Take 325 mg by mouth daily (with breakfast)Historical Med      !! enalapril (VASOTEC) 10 MG tablet TAKE ONE TABLET BY MOUTH EVERY DAY, Disp-30 tablet, R-11      !! enalapril (VASOTEC) 5 MG tablet TAKE ONE TABLET BY MOUTH DAILY, Disp-30 tablet, R-11      metoprolol (LOPRESSOR) 50 MG tablet Take 1 tablet by mouth 2 times daily, Disp-60 tablet, R-11Must keep appointment for further refills      !! warfarin (COUMADIN) 5 MG tablet TAKE TWO TABLETS BY MOUTH DAILY, Disp-60 tablet, R-11      !! warfarin (COUMADIN) 5 MG tablet Take 2 tablets by mouth daily. , Disp-60 tablet, R-11      aspirin 81 MG tablet Take 81 mg by mouth daily. !! - Potential duplicate medications found. Please discuss with provider.           ALLERGIES     No known allergies    FAMILY HISTORY       Family History   Problem Relation Age of Onset    Cancer Father         colon          SOCIAL HISTORY       Social History     Social History    Marital status: Single     Spouse name: N/A    Number of children: N/A    Years of education: N/A     Social History Main Topics    Smoking status: Never Smoker    Smokeless tobacco: Never Used    Alcohol use Yes      Comment: socially    Drug use: No    Sexual activity: Not Asked     Other Topics Concern    None     Social History Narrative    None       SCREENINGS    Angelique Coma Scale  Eye Opening: Spontaneous  Best Verbal Response: Oriented  Best Motor Response: Obeys commands  Angelique Coma Scale Score: 15        PHYSICAL EXAM    (up to 7 for level 4, 8 or more for level 5)     ED Triage Vitals [08/08/18 2355]   BP Temp Temp Source Pulse Resp SpO2 Height Weight   (!) 146/83 98.3 °F (36.8 °C) Tympanic 81 16 95 % -- --       Physical Exam   Constitutional: He is oriented to person, place, and time. He appears well-developed and well-nourished. HENT:   Head: Normocephalic and atraumatic. Right Ear: External ear normal.   Left Ear: External ear normal.   Nose: Nose normal.   Eyes: Pupils are equal, round, and reactive to light. Conjunctivae and EOM are normal.   Neck: Normal range of motion. Neck supple. Cardiovascular: Normal rate, regular rhythm, normal heart sounds and intact distal pulses. Pulmonary/Chest: Effort normal and breath sounds normal.   Abdominal: Soft. Bowel sounds are normal. There is no tenderness. Musculoskeletal: Normal range of motion. Neurological: He is alert and oriented to person, place, and time. Skin: Skin is warm and dry. DIAGNOSTIC RESULTS     EKG: All EKG's are interpreted by the Emergency Department Physician who either signs or Co-signs this chart in the absence of a cardiologist.      RADIOLOGY:   Non-plain film images such as CT, Ultrasound and MRI are read by the radiologist. Plain radiographic images are visualized and preliminarily interpreted by the emergency physician with the below findings:      Interpretation per the Radiologist below, if available at the time of this note:    CTA CHEST W CONTRAST   Final Result   Impression: The heart is slightly enlarged. There is no pericardial effusion or mass. .      There has been thoracic aortic aneurysm repair surgery. symptoms. Procedures    FINAL IMPRESSION      1. Chest pain, unspecified type          DISPOSITION/PLAN   DISPOSITION Decision To Discharge 08/09/2018 08:14:01 AM      PATIENT REFERRED TO:  No follow-up provider specified. DISCHARGE MEDICATIONS:  Discharge Medication List as of 8/9/2018  8:15 AM                 Summation      Patient Course:      ED Medications administered this visit:    Medications   iopamidol (ISOVUE-300) 61 % injection 100 mL (100 mLs Intravenous Given 8/9/18 0043)   morphine (PF) injection 4 mg (4 mg Intravenous Given 8/9/18 0123)   ondansetron (ZOFRAN) injection 4 mg (4 mg Intravenous Given 8/9/18 0122)   morphine injection 2 mg (2 mg Intravenous Given 8/9/18 0538)       New Prescriptions from this visit:    Discharge Medication List as of 8/9/2018  8:15 AM          Follow-up:  No follow-up provider specified. Final Impression:   1.  Chest pain, unspecified type               (Please note that portions of this note were completed with a voice recognition program.  Efforts were made to edit the dictations but occasionally words are mis-transcribed.)           Gisell Bravo MD  08/14/18 0122